# Patient Record
Sex: MALE | Race: WHITE | NOT HISPANIC OR LATINO | URBAN - METROPOLITAN AREA
[De-identification: names, ages, dates, MRNs, and addresses within clinical notes are randomized per-mention and may not be internally consistent; named-entity substitution may affect disease eponyms.]

---

## 2018-06-26 ENCOUNTER — APPOINTMENT (RX ONLY)
Dept: URBAN - METROPOLITAN AREA CLINIC 343 | Facility: CLINIC | Age: 43
Setting detail: DERMATOLOGY
End: 2018-06-26

## 2018-06-26 DIAGNOSIS — L30.9 DERMATITIS, UNSPECIFIED: ICD-10-CM

## 2018-06-26 PROCEDURE — 99202 OFFICE O/P NEW SF 15 MIN: CPT | Mod: 25

## 2018-06-26 PROCEDURE — ? COUNSELING

## 2018-06-26 PROCEDURE — 11101: CPT

## 2018-06-26 PROCEDURE — ? PRESCRIPTION

## 2018-06-26 PROCEDURE — 11100: CPT

## 2018-06-26 PROCEDURE — ? BIOPSY BY PUNCH METHOD

## 2018-06-26 PROCEDURE — ? TREATMENT REGIMEN

## 2018-06-26 RX ORDER — BETAMETHASONE DIPROPIONATE 0.5 MG/G
CREAM TOPICAL BID
Qty: 2 | Refills: 1 | Status: ERX | COMMUNITY
Start: 2018-06-26

## 2018-06-26 RX ADMIN — BETAMETHASONE DIPROPIONATE: 0.5 CREAM TOPICAL at 00:00

## 2018-06-26 ASSESSMENT — LOCATION DETAILED DESCRIPTION DERM
LOCATION DETAILED: LEFT LATERAL ABDOMEN
LOCATION DETAILED: RIGHT ANTERIOR MEDIAL PROXIMAL UPPER ARM
LOCATION DETAILED: LEFT DISTAL DORSAL FOREARM
LOCATION DETAILED: RIGHT PROXIMAL DORSAL FOREARM
LOCATION DETAILED: PERIUMBILICAL SKIN
LOCATION DETAILED: LEFT ANTERIOR PROXIMAL UPPER ARM

## 2018-06-26 ASSESSMENT — LOCATION SIMPLE DESCRIPTION DERM
LOCATION SIMPLE: ABDOMEN
LOCATION SIMPLE: LEFT FOREARM
LOCATION SIMPLE: RIGHT UPPER ARM
LOCATION SIMPLE: LEFT UPPER ARM
LOCATION SIMPLE: RIGHT FOREARM

## 2018-06-26 ASSESSMENT — LOCATION ZONE DERM
LOCATION ZONE: TRUNK
LOCATION ZONE: ARM

## 2018-06-26 NOTE — PROCEDURE: BIOPSY BY PUNCH METHOD
Body Location Override (Optional - Billing Will Still Be Based On Selected Body Map Location If Applicable): left upper  abdomen

## 2018-06-26 NOTE — PROCEDURE: TREATMENT REGIMEN
Detail Level: Simple
Plan: Patient is to contact the office if any problems occur for further evaluation and management. Will recheck in two weeks
Initiate Treatment: Applying betamethasone cream twice a day to affected areas for two weeks then recheck

## 2018-06-26 NOTE — PROCEDURE: BIOPSY BY PUNCH METHOD
Body Location Override (Optional - Billing Will Still Be Based On Selected Body Map Location If Applicable): left axilla

## 2018-06-26 NOTE — PROCEDURE: BIOPSY BY PUNCH METHOD
Body Location Override (Optional - Billing Will Still Be Based On Selected Body Map Location If Applicable): left hip

## 2018-07-10 ENCOUNTER — APPOINTMENT (RX ONLY)
Dept: URBAN - METROPOLITAN AREA CLINIC 343 | Facility: CLINIC | Age: 43
Setting detail: DERMATOLOGY
End: 2018-07-10

## 2018-07-10 DIAGNOSIS — L30.9 DERMATITIS, UNSPECIFIED: ICD-10-CM

## 2018-07-10 DIAGNOSIS — Z48.02 ENCOUNTER FOR REMOVAL OF SUTURES: ICD-10-CM

## 2018-07-10 PROCEDURE — ? COUNSELING

## 2018-07-10 PROCEDURE — 99213 OFFICE O/P EST LOW 20 MIN: CPT

## 2018-07-10 PROCEDURE — ? SUTURE REMOVAL (GLOBAL PERIOD)

## 2018-07-10 PROCEDURE — ? TREATMENT REGIMEN

## 2018-07-10 ASSESSMENT — LOCATION ZONE DERM
LOCATION ZONE: TRUNK
LOCATION ZONE: LEG

## 2018-07-10 ASSESSMENT — LOCATION DETAILED DESCRIPTION DERM
LOCATION DETAILED: LEFT RIB CAGE
LOCATION DETAILED: LEFT ANTERIOR DISTAL THIGH
LOCATION DETAILED: RIGHT ANTERIOR DISTAL THIGH
LOCATION DETAILED: EPIGASTRIC SKIN

## 2018-07-10 ASSESSMENT — LOCATION SIMPLE DESCRIPTION DERM
LOCATION SIMPLE: RIGHT THIGH
LOCATION SIMPLE: ABDOMEN
LOCATION SIMPLE: LEFT THIGH

## 2018-07-10 NOTE — PROCEDURE: TREATMENT REGIMEN
Detail Level: Simple
Plan: Discussed in the room that we would like to see the rash flare up. Due to history of blisters this would be optimum to perform a punch biopsy for Immunoflorescence.  \\nWill make an appointment for 3 weeks, but if patient experiences blisters before this appointment he is to call the office to see if he can get in sooner for the biopsy. Although Bullous pemphigoid is unusual at this age we cannot rule it out.
Discontinue Regimen: Patient is advised to stop all topical medications at this time. No antihistamines are to be used either.
Otc Regimen: Patient can use only Cetaphil lotion and Cetaphil wash. Samples given of both. Patient to switch laundry detergent to Dreft as this is hypoallergenic.

## 2018-07-10 NOTE — PROCEDURE: SUTURE REMOVAL (GLOBAL PERIOD)
Detail Level: Detailed
Add 74546 Cpt? (Important Note: In 2017 The Use Of 98500 Is Being Tracked By Cms To Determine Future Global Period Reimbursement For Global Periods): no
Body Location Override (Optional - Billing Will Still Be Based On Selected Body Map Location If Applicable): left hip, left abdomen, left axilla

## 2018-08-02 ENCOUNTER — APPOINTMENT (RX ONLY)
Dept: URBAN - METROPOLITAN AREA CLINIC 343 | Facility: CLINIC | Age: 43
Setting detail: DERMATOLOGY
End: 2018-08-02

## 2018-08-02 DIAGNOSIS — L30.9 DERMATITIS, UNSPECIFIED: ICD-10-CM | Status: IMPROVED

## 2018-08-02 DIAGNOSIS — L28.1 PRURIGO NODULARIS: ICD-10-CM

## 2018-08-02 PROCEDURE — ? COUNSELING

## 2018-08-02 PROCEDURE — 99213 OFFICE O/P EST LOW 20 MIN: CPT

## 2018-08-02 PROCEDURE — ? TREATMENT REGIMEN

## 2018-08-02 ASSESSMENT — LOCATION ZONE DERM
LOCATION ZONE: LEG
LOCATION ZONE: ARM
LOCATION ZONE: TRUNK

## 2018-08-02 ASSESSMENT — LOCATION DETAILED DESCRIPTION DERM
LOCATION DETAILED: PERIUMBILICAL SKIN
LOCATION DETAILED: RIGHT ANTERIOR PROXIMAL THIGH
LOCATION DETAILED: LEFT ANTERIOR PROXIMAL THIGH
LOCATION DETAILED: LEFT DISTAL DORSAL FOREARM

## 2018-08-02 ASSESSMENT — LOCATION SIMPLE DESCRIPTION DERM
LOCATION SIMPLE: LEFT THIGH
LOCATION SIMPLE: RIGHT THIGH
LOCATION SIMPLE: LEFT FOREARM
LOCATION SIMPLE: ABDOMEN

## 2018-08-02 NOTE — PROCEDURE: TREATMENT REGIMEN
Otc Regimen: Recommended patient use Aquaphor as needed
Plan: Patient to contact the office if any problems occur for further evaluation and management. If the rash flares up the patient is to contact the office to make an appointment to have a punch biopsy for Immunoflorescence performed
Detail Level: Simple

## 2019-01-02 ENCOUNTER — RX ONLY (OUTPATIENT)
Age: 44
Setting detail: RX ONLY
End: 2019-01-02

## 2019-01-02 ENCOUNTER — APPOINTMENT (RX ONLY)
Dept: URBAN - METROPOLITAN AREA CLINIC 343 | Facility: CLINIC | Age: 44
Setting detail: DERMATOLOGY
End: 2019-01-02

## 2019-01-02 DIAGNOSIS — Q82.8 OTHER SPECIFIED CONGENITAL MALFORMATIONS OF SKIN: ICD-10-CM

## 2019-01-02 PROBLEM — L30.9 DERMATITIS, UNSPECIFIED: Status: ACTIVE | Noted: 2019-01-02

## 2019-01-02 PROCEDURE — ? PRESCRIPTION

## 2019-01-02 PROCEDURE — 99213 OFFICE O/P EST LOW 20 MIN: CPT | Mod: 25

## 2019-01-02 PROCEDURE — ? BIOPSY BY PUNCH METHOD

## 2019-01-02 PROCEDURE — 11104 PUNCH BX SKIN SINGLE LESION: CPT

## 2019-01-02 PROCEDURE — ? TREATMENT REGIMEN

## 2019-01-02 PROCEDURE — 11105 PUNCH BX SKIN EA SEP/ADDL: CPT

## 2019-01-02 PROCEDURE — ? ORDER TESTS

## 2019-01-02 RX ORDER — CLOBETASOL PROPIONATE 0.5 MG/G
1 OINTMENT TOPICAL BID
Qty: 1 | Refills: 2 | Status: ERX | COMMUNITY
Start: 2019-01-02

## 2019-01-02 RX ORDER — BETAMETHASONE DIPROPIONATE 0.5 MG/ML
1 LOTION TOPICAL BID
Qty: 1 | Refills: 1 | Status: ERX | COMMUNITY
Start: 2019-01-02

## 2019-01-02 RX ADMIN — CLOBETASOL PROPIONATE 1: 0.5 OINTMENT TOPICAL at 19:28

## 2019-01-02 ASSESSMENT — LOCATION ZONE DERM
LOCATION ZONE: AXILLAE
LOCATION ZONE: LEG
LOCATION ZONE: TRUNK
LOCATION ZONE: ARM
LOCATION ZONE: NOSE

## 2019-01-02 ASSESSMENT — LOCATION SIMPLE DESCRIPTION DERM
LOCATION SIMPLE: LEFT AXILLA
LOCATION SIMPLE: ABDOMEN
LOCATION SIMPLE: LEFT POPLITEAL SKIN
LOCATION SIMPLE: LEFT NOSE
LOCATION SIMPLE: LEFT UPPER ARM
LOCATION SIMPLE: RIGHT NOSE
LOCATION SIMPLE: GROIN
LOCATION SIMPLE: RIGHT POPLITEAL SKIN

## 2019-01-02 ASSESSMENT — LOCATION DETAILED DESCRIPTION DERM
LOCATION DETAILED: RIGHT NARIS
LOCATION DETAILED: RIGHT POPLITEAL SKIN
LOCATION DETAILED: LEFT ANTERIOR PROXIMAL UPPER ARM
LOCATION DETAILED: LEFT NARIS
LOCATION DETAILED: LEFT LATERAL ABDOMEN
LOCATION DETAILED: LEFT ANTERIOR AXILLA
LOCATION DETAILED: LEFT INGUINAL CREASE
LOCATION DETAILED: LEFT POPLITEAL SKIN
LOCATION DETAILED: RIGHT INGUINAL CREASE

## 2019-01-02 NOTE — PROCEDURE: TREATMENT REGIMEN
Detail Level: Simple
Plan: We will hold off on oral medications at this time. Plan to treat with clobetasol 0.05% ointment, apply BID to rash in affected areas for two weeks. We will recheck at that time. Will discuss treatment options pending biopsy results. Will send photos to dermatopathologist for consideration. Patient is to monitor the area and contact the office with any problems

## 2019-01-02 NOTE — HPI: RASH
How Severe Is Your Rash?: severe
Is This A New Presentation, Or A Follow-Up?: Rash
Additional History: The patient has had this rash come and go since childhood. He has previous diagnostic workup includes: POLYMORPHOUS DERMAL INFILTRATE CONTAINING FREQUENT EOSINOPHILS, PROMINENT PAPILLARY DERMAL EDEMA AND INCIPIENT SUBEPIDERMAL BLISTER FORMATION SEE COMMENT COMMENTS: PART 1 (left hip): DIAGNOSTIC CONSIDERATIONS FOR THIS PATTERN INCLUDE A BULLOUS HYPERSENSITIVITY REACTION (INCLUDING BULLOUS DRUG ERUPTION) AND THE URTICARIAL PHASE OF AN IMMUNOBULLOUS DISORDER. IF AN IMMUNOBULLOUS DISORDER IS A RELEVANT CLINICAL CONSIDERATION, THEN, CORRELATION WITH IMMUNOFLUORESCENCE STUDY SHOULD BE CONSIDERED

## 2019-01-02 NOTE — PROCEDURE: BIOPSY BY PUNCH METHOD
Body Location Override (Optional - Billing Will Still Be Based On Selected Body Map Location If Applicable): left abdomen (for IF)

## 2019-01-09 ENCOUNTER — RX ONLY (OUTPATIENT)
Age: 44
Setting detail: RX ONLY
End: 2019-01-09

## 2019-01-09 RX ORDER — CEPHALEXIN 500 MG/1
CAPSULE ORAL
Qty: 40 | Refills: 0 | Status: ERX | COMMUNITY
Start: 2019-01-09

## 2019-01-16 ENCOUNTER — APPOINTMENT (RX ONLY)
Dept: URBAN - METROPOLITAN AREA CLINIC 153 | Facility: CLINIC | Age: 44
Setting detail: DERMATOLOGY
End: 2019-01-16

## 2019-01-16 ENCOUNTER — APPOINTMENT (RX ONLY)
Dept: URBAN - METROPOLITAN AREA CLINIC 343 | Facility: CLINIC | Age: 44
Setting detail: DERMATOLOGY
End: 2019-01-16

## 2019-01-16 DIAGNOSIS — L30.9 DERMATITIS, UNSPECIFIED: ICD-10-CM | Status: RESOLVING

## 2019-01-16 PROCEDURE — ? TREATMENT REGIMEN

## 2019-01-16 PROCEDURE — 99212 OFFICE O/P EST SF 10 MIN: CPT

## 2019-01-16 ASSESSMENT — LOCATION DETAILED DESCRIPTION DERM
LOCATION DETAILED: GENITALIA
LOCATION DETAILED: EPIGASTRIC SKIN
LOCATION DETAILED: LEFT ANTERIOR AXILLA
LOCATION DETAILED: RIGHT ANTERIOR AXILLA

## 2019-01-16 ASSESSMENT — LOCATION SIMPLE DESCRIPTION DERM
LOCATION SIMPLE: GENITALIA
LOCATION SIMPLE: RIGHT AXILLA
LOCATION SIMPLE: ABDOMEN
LOCATION SIMPLE: LEFT AXILLA

## 2019-01-16 ASSESSMENT — LOCATION ZONE DERM
LOCATION ZONE: TRUNK
LOCATION ZONE: AXILLAE
LOCATION ZONE: GENITALIA

## 2019-01-16 NOTE — PROCEDURE: TREATMENT REGIMEN
Modify Regimen: Patient was unable to obtain clobetasol due to insurance and betamethasone lotion has been call in instead. He is to decrease betamethasone lotion from BID to QD for the next week and then stop.
Continue Regimen: Keflex 500mg PO four times a day until finished.
Plan: Sutures were removed from biopsy sites. They are healing well with no signs and symptoms of infection. We discussed the patients biopsy results at length today. He is aware they are inconclusive and require further testing. He is aware the DIF was negative. We discussed that in the presence of bullou phempigoid, it is possible for the DIF to be negative and we would like to further rule this out by an SORIN lab test for BP Antigen 1 and BP Antigen 2. A lab slip was given to the patient to have this performed. After further discussion with the patient, it was discovered that the rash has recurred in the same area as previous time. Also, the patient also appears to experience prodromal symptoms such as a boil in the groin or respiratory like illness prior to the outbreak or flare-up of the rash. We will discuss the possibility of a herpetic rash with the dermatopatholgist in light of this. The patient also stated that his brother suffers from a similar disease. He also admitted that he takes no medications, including OTC medications. We will await the lab results and evaluate from there. The patient is to monitor the area and contact the office with any further problems.
Detail Level: Zone

## 2019-01-16 NOTE — HPI: OTHER
Condition:: F/U Dermatitis Unspecified evaluated on January 2, 2019
Please Describe Your Condition:: is following up for dermatitis unspecified on the left abdomen, left abdomen (for IF), left axilla, right inguinal crease, left inguinal crease, right naris, and left naris. He was seen on January 2, 2019, at which time he was prescribed Clobetasol 0.05 % topical ointment BID (Apply twice daily to rash in affected areas (arms, chest, groin and legs) for two weeks), Biopsy by Punch Method was performed, and The following labs were ordered: Bacteria Skin Aerobe Cult (Heavy Growth of Staphylococcus Aureus (abnormal, treated with Keflex 500mg, PO four times a day for 10 days)). The pathology shows: POLYMORPHOUS DERMAL INFILTRATE CONTAINING FREQUENT EOSINOPHILS, PAPILLARY\\nDERMAL EDEMA, INCIPIENT SUBEPIDERMAL BLISTER FORMATION, EXOCYTOSIS OF\\nEOSINOPHILS INTO OVERLYING EPIDERMIS AND EPIDERMAL SPONGIOSIS\\nSEE COMMENT\\nCOMMENTS:\\nPART 1 (left abdomen): DIAGNOSTIC CONSIDERATION FOR THIS PATTERN INCLUDED A VESICULAR\\nDERMAL HYPERSENSITIVITY REACTION AND AN IMMUNOBULLOUS DISORDER. VESICULAR\\nDERMAL HYPERSENSITIVITY REACTION MAYBE SEEN IN RESPONSE TO A NUMBER OF\\nENDOGENOUS OR EXOGENOUS AGENTS INCLUDING CONTACTANTS AND MEDICATIONS.\\nCORRELATION WITH THE CLINICAL HISTORY, PRESENTATION, DISEASE PROGRESSION,\\nRESULT OF IMMUNOFLUORESCENCE STUDIES AND/OR SORIN STUDY IS ADVISED TO ASSISTED\\Ronald DISTINGUISHING BETWEEN THESE POSSIBILITIES. We recommended the following: Schedule : Follow Up Visit. The pathology shows: SUBMITTED FOR IMMUNOFLUORESCENCE\\nSEE COMMENT\\nPART 2 (left abdomen): THIS SPECIMEN IS BEING REFERRED FOR IMMUNOFLUORESCENCE. AS SOON AS\\nTHE REPORT BECOMES AVAILABLE, WE WILL FORWARD IT TO YOU IMMEDIATELY\\n- INTERPRETATION: NEGATIVE DIRECT IMMUNOFLUORESCENCE STUDY.\\nIgG: Negative for clinically relevant deposition.\\nIgA: Negative for clinically relevant deposition.\\nIgM: Negative for clinically relevant deposition.\\nC3: Negative for clinically relevant deposition.\\nFibrinogen: Negative for clinically relevant deposition.\\nNOTE: Clinical correlation is recommended. We recommended the following: Schedule : Follow up visit. The pathology shows: POLYMORPHOUS DERMAL INFILTRATE CONTAINING FREQUENT EOSINOPHILS, PAPILLARY\\nDERMAL EDEMA, INCIPIENT SUBEPIDERMAL BLISTER FORMATION, EXOCYTOSIS OF\\nEOSINOPHILS INTO OVERLYING EPIDERMIS AND EPIDERMAL SPONGIOSIS\\nSEE COMMENT\\nCOMMENT:\\nPART 3 (left axilla): DIAGNOSTIC CONSIDERATION FOR THIS PATTERN INCLUDED A VESICULAR DERMAL\\nHYPERSENSITIVITY REACTION AND AN IMMUNOBULLOUS DISORDER. VESICULAR DERMAL\\nHYPERSENSITIVITY REACTION MAYBE SEEN IN RESPONSE TO A NUMBER OF ENDOGENOUS\\nOR EXOGENOUS AGENTS INCLUDING CONTACTANTS AND MEDICATIONS. CORRELATION\\nWITH THE CLINICAL HISTORY, PRESENTATION, DISEASE PROGRESSION, RESULT OF\\nIMMUNOFLUORESCENCE STUDIES AND/OR SORIN STUDY IS ADVISED TO ASSISTED IN\\nDISTINGUISHING BETWEEN THESE POSSIBILITIES. We recommended the following: Schedule : Follow up visit.\\n\\nThe patient is now here for further evaluation and management.\\n\\nInterval history: Patient feels he is much improved.
Please Describe Your Condition:: is following up for dermatitis unspecified on the abdomen, left axilla, left popliteal skin, and right popliteal skin. He was seen on January 2, 2019, at which time \\nThe following treatment regimen was given: \\nPlan: We will hold off on oral medications at this time. Plan to treat with clobetasol 0.05% ointment, apply BID to rash in affected areas for two weeks. We will recheck at that time. Will discuss treatment options pending biopsy results. Will send photos to dermatopathologist for consideration. Patient is to monitor the area and contact the office with any problems. \\n\\nThe patient is now here for further evaluation and management.\\n\\nInterval history: Patient states the rash is much improved.
